# Patient Record
Sex: MALE | Race: BLACK OR AFRICAN AMERICAN
[De-identification: names, ages, dates, MRNs, and addresses within clinical notes are randomized per-mention and may not be internally consistent; named-entity substitution may affect disease eponyms.]

---

## 2021-03-19 ENCOUNTER — HOSPITAL ENCOUNTER (EMERGENCY)
Dept: HOSPITAL 72 - EMR | Age: 26
Discharge: HOME | End: 2021-03-19
Payer: COMMERCIAL

## 2021-03-19 VITALS — BODY MASS INDEX: 24.99 KG/M2 | HEIGHT: 65 IN | WEIGHT: 150 LBS

## 2021-03-19 VITALS — SYSTOLIC BLOOD PRESSURE: 137 MMHG | DIASTOLIC BLOOD PRESSURE: 88 MMHG

## 2021-03-19 DIAGNOSIS — K64.9: Primary | ICD-10-CM

## 2021-03-19 LAB
ADD MANUAL DIFF: NO
ALBUMIN SERPL-MCNC: 4.2 G/DL (ref 3.4–5)
ALBUMIN/GLOB SERPL: 1.4 {RATIO} (ref 1–2.7)
ALP SERPL-CCNC: 60 U/L (ref 46–116)
ALT SERPL-CCNC: 24 U/L (ref 12–78)
ANION GAP SERPL CALC-SCNC: 9 MMOL/L (ref 5–15)
APPEARANCE UR: CLEAR
APTT BLD: 28 SEC (ref 23–33)
APTT PPP: (no result) S
AST SERPL-CCNC: 15 U/L (ref 15–37)
BASOPHILS NFR BLD AUTO: 0.7 % (ref 0–2)
BILIRUB SERPL-MCNC: 0.2 MG/DL (ref 0.2–1)
BUN SERPL-MCNC: 20 MG/DL (ref 7–18)
CALCIUM SERPL-MCNC: 9.7 MG/DL (ref 8.5–10.1)
CHLORIDE SERPL-SCNC: 102 MMOL/L (ref 98–107)
CO2 SERPL-SCNC: 30 MMOL/L (ref 21–32)
CREAT SERPL-MCNC: 1.2 MG/DL (ref 0.55–1.3)
EOSINOPHIL NFR BLD AUTO: 2 % (ref 0–3)
ERYTHROCYTE [DISTWIDTH] IN BLOOD BY AUTOMATED COUNT: 12.8 % (ref 11.6–14.8)
GLOBULIN SER-MCNC: 2.9 G/DL
GLUCOSE UR STRIP-MCNC: NEGATIVE MG/DL
HCT VFR BLD CALC: 43.1 % (ref 42–52)
HGB BLD-MCNC: 14.2 G/DL (ref 14.2–18)
INR PPP: 1 (ref 0.9–1.1)
KETONES UR QL STRIP: NEGATIVE
LEUKOCYTE ESTERASE UR QL STRIP: (no result)
LYMPHOCYTES NFR BLD AUTO: 25.5 % (ref 20–45)
MCV RBC AUTO: 97 FL (ref 80–99)
MONOCYTES NFR BLD AUTO: 7.3 % (ref 1–10)
NEUTROPHILS NFR BLD AUTO: 64.6 % (ref 45–75)
NITRITE UR QL STRIP: NEGATIVE
PH UR STRIP: 6.5 [PH] (ref 4.5–8)
PLATELET # BLD: 160 K/UL (ref 150–450)
POTASSIUM SERPL-SCNC: 3.9 MMOL/L (ref 3.5–5.1)
PROT UR QL STRIP: NEGATIVE
RBC # BLD AUTO: 4.42 M/UL (ref 4.7–6.1)
SODIUM SERPL-SCNC: 140 MMOL/L (ref 136–145)
SP GR UR STRIP: 1.02 (ref 1–1.03)
UROBILINOGEN UR-MCNC: NORMAL MG/DL (ref 0–1)
WBC # BLD AUTO: 9.4 K/UL (ref 4.8–10.8)

## 2021-03-19 PROCEDURE — 85730 THROMBOPLASTIN TIME PARTIAL: CPT

## 2021-03-19 PROCEDURE — 85025 COMPLETE CBC W/AUTO DIFF WBC: CPT

## 2021-03-19 PROCEDURE — 85610 PROTHROMBIN TIME: CPT

## 2021-03-19 PROCEDURE — 80053 COMPREHEN METABOLIC PANEL: CPT

## 2021-03-19 PROCEDURE — 96360 HYDRATION IV INFUSION INIT: CPT

## 2021-03-19 PROCEDURE — 74177 CT ABD & PELVIS W/CONTRAST: CPT

## 2021-03-19 PROCEDURE — 99284 EMERGENCY DEPT VISIT MOD MDM: CPT

## 2021-03-19 PROCEDURE — 36415 COLL VENOUS BLD VENIPUNCTURE: CPT

## 2021-03-19 PROCEDURE — 81003 URINALYSIS AUTO W/O SCOPE: CPT

## 2021-03-19 NOTE — NUR
pt in room. medicated per eMAR. pt denies pain at this time. will continue to 
monitor. all labs sent to lab.

## 2021-03-19 NOTE — NUR
ED Nurse Note:



Patient from home and walked in due to hemorrhoids. Per patient he was lifting 
a heavy object at work then he went to the rest room and had blood on his 
underwear. Patient is AAO x4, ambulatory with non labored breathing.

## 2021-03-19 NOTE — EMERGENCY ROOM REPORT
History of Present Illness


General


Chief Complaint:  Pain


Source:  Patient





Present Illness


HPI


25-year-old male with history of recurrences of thrombosed hemorrhoids here 

complaining of excess amount of rectal bleeding after lifting heavy [place] at 

work and immediately having pain and pressure at the site of his hemorrhoid with

menstrual bleeding.  Reports that he usually takes a stool softener.  Reports 

that he has not done any dietary changes and does not consume enough fiber.  

Denies nausea vomiting and diffuse abdominal pain.  Denies any pain at this 

time.  Has not yet followed up with gastroenterologist.  Denies any fall or 

injury.


Allergies:  


Coded Allergies:  


     No Known Allergies (Unverified , 5/6/13)





COVID-19 Screening


Contact w/high risk pt:  No


Experienced COVID-19 symptoms?:  No


COVID-19 Testing performed PTA:  Yes


COVID-19 Screening:  Negative COVID-19


COVID-19 Testing Source:  NP





Patient History


Past Medical History:  see triage record


Past Surgical History:  none


Pertinent Family History:  none


Reviewed Nursing Documentation:  PMH: Agreed; PSxH: Agreed





Nursing Documentation-PMH


Past Medical History:  No History, Except For


Hx Cardiac Problems:  No - MIGRAINE


Hx Gastrointestinal Problems:  No - hemmorhoids





Review of Systems


All Other Systems:  negative except mentioned in HPI





Physical Exam





Vital Signs








  Date Time  Temp Pulse Resp B/P (MAP) Pulse Ox O2 Delivery O2 Flow Rate FiO2


 


3/19/21 13:50 98.1 63 16 133/78 (96) 95 Room Air  








Sp02 EP Interpretation:  reviewed, normal


General Appearance:  no apparent distress, alert, GCS 15, non-toxic


Head:  normocephalic, atraumatic


Eyes:  bilateral eye normal inspection, bilateral eye PERRL


ENT:  hearing grossly normal, normal pharynx, no angioedema, normal voice


Neck:  supple, thyroid normal, no meningismus, no bony tend, no carotid bruits


Respiratory:  no rhonchi, no respiratory distress, no retraction, no accessory 

muscle use, no wheezing


Cardiovascular #1:  regular rate, rhythm, no edema, no gallop, no JVD, no murmur


Gastrointestinal:  non tender, soft, no mass, no organomegaly, no peritonitis


Rectal:  other - Appears to have had a thrombosed hemorrhoid however no distress

and external hemorrhoid minimal bleeding noted no pus drainage noted


Genitourinary:  no CVA tenderness


Musculoskeletal:  back normal


Neurologic:  alert, motor strength/tone normal, oriented x3, sensory intact, 

responsive, speech normal


Psychiatric:  judgement/insight normal, memory normal, mood/affect normal, no 

suicidal/homicidal ideation


Skin:  no rash


Lymphatic:  no adenopathy





Medical Decision Making


PA Attestation


All my diagnosis and treatment plans were reviewed ad discussed with my 

supervising physician Dr. Browning


Diagnostic Impression:  


   Primary Impression:  


   Bleeding hemorrhoid


ER Course


25-year-old male with history of recurrences of thrombosed hemorrhoids here 

complaining of excess amount of rectal bleeding after lifting heavy [place] at 

work and immediately having pain and pressure at the site of his hemorrhoid with

menstrual bleeding.  Reports that he usually takes a stool softener.  Reports 

that he has not done any dietary changes and does not consume enough fiber.  

Denies nausea vomiting and diffuse abdominal pain.  Denies any pain at this 

time.  Has not yet followed up with gastroenterologist.  Denies any fall or 

injury.





Ddx considered but are not limited to: Melena, diverticulitis, diverticulosis, 

internal hemorrhoid, external hemorrhoid, thrombosed hemorrhoid








Vital signs: are WNL, pt. is afebrile








 H&PE are most consistent with: Bleeding hemorrhoid














ORDERS: abdominal CT, UA, CBC, CMP, Anusol cream, Bactrim DS, ibuprofen











ED INTERVENTIONS: NS bolus

















DISCHARGE: At this time pt. is stable for d/c to home. Will provide printed 

patient care instructions, and any necessary prescriptions. Care plan and follow

up instructions have been discussed with the patient prior to discharge.  

Follow-up with gastroenterologist, take medication as directed, avoid lifting 

heavy objects, if worsening symptoms return to the emergency room also educated 

patient on need taking more fiber.


CT/MRI/US Diagnostic Results


CT/MRI/US Diagnostic Results :  


   Imaging Test Ordered:  CT abdomen pelvis with contrast


   Impression


Comparison: none


 


Findings: No evidence of diverticulosis or diverticulitis. The appendix is only


questionably visualized, but no findings to suggest acute appendicitis are 

evident.


No free or loculated peritoneal gas or fluid is evident. Distal esophagus, 

stomach,


duodenum are unremarkable.


 


The liver, gallbladder, bile ducts, pancreas, spleen, adrenals, kidneys are


unremarkable. No retroperitoneal or mesenteric mass or adenopathy. No pelvic 

mass or


adenopathy. No renal or ureteral calculi, hydronephrosis, nor hydroureter. The


included lung bases are clear. The bones are unremarkable.


 


Impression: Negative





Last Vital Signs








  Date Time  Temp Pulse Resp B/P (MAP) Pulse Ox O2 Delivery O2 Flow Rate FiO2


 


3/19/21 13:50 98.1 63 16 133/78 (96) 95 Room Air  








Disposition:  HOME, SELF-CARE


Condition:  Stable


Scripts


Trimethoprim/Sulfamethoxazole 160/800* (BACTRIM DS TABLET*) 1 Each Tablet


1 TAB ORAL TWICE A DAY for 7 Days, #14 TAB


   Prov: Jaron Mcgill         3/19/21 


Ibuprofen (Ibu) 800 Mg Tablet


800 MG PO BID, #30 TAB


   Prov: Jaron Mcgill         3/19/21 


Hydrocortisone Hc 2.5% Cream (ANUSOL-HC 2.5% CREAM) Y Cr


30 GM RC BID, #30 GM


   Prov: Jaron Mcgill         3/19/21


Referrals:  


NOT CHOSEN IPA/MD,REFERRING (PCP)


Patient Instructions:  Hemorrhoids





Additional Instructions:  


Take medication as directed, follow with your primary care provider and 

gastroenterologist, increase your fiber intake, worsening symptom return to the 

emergency











Jaron Mcgill      Mar 19, 2021 15:58